# Patient Record
Sex: FEMALE | Race: WHITE | NOT HISPANIC OR LATINO | Employment: FULL TIME | URBAN - METROPOLITAN AREA
[De-identification: names, ages, dates, MRNs, and addresses within clinical notes are randomized per-mention and may not be internally consistent; named-entity substitution may affect disease eponyms.]

---

## 2019-03-11 ENCOUNTER — MEDICAL CORRESPONDENCE (OUTPATIENT)
Dept: CARDIOLOGY | Facility: CLINIC | Age: 24
End: 2019-03-11

## 2019-07-10 ENCOUNTER — TELEPHONE (OUTPATIENT)
Dept: CARDIOLOGY | Facility: CLINIC | Age: 24
End: 2019-07-10

## 2019-07-10 NOTE — TELEPHONE ENCOUNTER
Patient is planning to get pregnant. Would like to switch BP medication regimen accordingly. Also stated she is set up to have an echo in [employer].

## 2019-07-11 NOTE — TELEPHONE ENCOUNTER
Called patient and left voicemail.  Message sent to Dr Raman to review discontinuing Cozaar while trying to become pregnant.  Will call back with further recommendations.    Bird Shine, RN  RN Care Coordinator  [facility]  [phone]

## 2019-07-19 ENCOUNTER — TELEPHONE (OUTPATIENT)
Dept: CARDIOLOGY | Facility: CLINIC | Age: 24
End: 2019-07-19

## 2019-07-19 NOTE — TELEPHONE ENCOUNTER
Patient LVM, requested to speak to a RN about whether some symptoms she is having are cardiac related.

## 2019-07-22 NOTE — TELEPHONE ENCOUNTER
Patient called and voicemail left with call back number to call back to discuss concerns.      Bird Shine, RN  RN Care Coordinator  [facility]  [phone]

## 2019-07-24 NOTE — TELEPHONE ENCOUNTER
Patient called and voicemail left with call back number to discuss symptoms.      Bird Shine, RN  RN Care Coordinator  [facility]  [phone]

## 2019-08-30 ENCOUNTER — TELEPHONE (OUTPATIENT)
Dept: CARDIOLOGY | Facility: CLINIC | Age: 24
End: 2019-08-30

## 2019-08-30 NOTE — TELEPHONE ENCOUNTER
Patient of Dr Raman in [facility]. She states she's had ongoing migraines and nausea for last 3 days. She had a fever day prior to this of 102. She went to Urgent Care yesterday where they prescribed her Excedrin and an anti-emetic. Still having symptoms, states the migraines have become a constant lingering and pounding. She called the urgent care back and they recommended going to the ED.   She called us as she remembers having headaches prior to her cardiac surgery. She doesn't remember if they were quite similar to these headaches as she was really young, but she otherwise does not normally get headaches.   I told her I would let Dr Raman know but with it being late on a Friday I also recommended she go to the ER for  workup to make sure nothing acute going on. Talk to ER up there as well about cardiac concerns.     Iris verbalized understanding and will go to ER.   Briseyda Crow RN

## 2019-08-30 NOTE — TELEPHONE ENCOUNTER
Iris left a VM on the phone stating that she has been having migraines and nausea and her mom wanted her to check to make sure that it is not cardiac related.    Please give her a call back at [phone].    Ana M Narayan, CMA

## 2021-10-15 ENCOUNTER — CARE COORDINATION (OUTPATIENT)
Dept: CARDIOLOGY | Facility: CLINIC | Age: 26
End: 2021-10-15
Payer: COMMERCIAL

## 2021-10-15 NOTE — PROGRESS NOTES
Called patient. Number not working. Per Care Everywhere, called emergency contact number ([phone]). [facility] asking for a callback from [place] to [phone].     Patient should be seen near end of first trimester with echo prior. Can be at [place].

## 2021-11-10 NOTE — PROGRESS NOTES
Called and spoke with patient. She is 12 weeks' pregnant today. Informed her that Dr. Raman would like to see her at end of first trimester with echo prior. Patient would like to be seen in [place]. Agreed to December 13. KARLA Londono will call patient with echo and appointment time confirmed. Patient verbalized understanding and is in agreement to the plan.

## 2023-01-10 ENCOUNTER — TELEPHONE (OUTPATIENT)
Dept: CARDIOLOGY | Facility: CLINIC | Age: 28
End: 2023-01-10

## 2023-01-10 DIAGNOSIS — Q25.1 COARCTATION OF AORTA (PREDUCTAL) (POSTDUCTAL): Primary | ICD-10-CM

## 2023-01-10 DIAGNOSIS — R00.2 PALPITATIONS: ICD-10-CM

## 2023-01-10 NOTE — TELEPHONE ENCOUNTER
"Called Iris to discuss her symptoms she reports for the past 3 days she has felt \"butterflies\" in her chest. She feels them the most during the day when she is up and walking. No issues at night. Her blood pressure one of these past days was elevated at 164/96.   Spoke with Dr. Raman, she would like a 7 day zio. Detailed VM left with patient. Will send out zio and follow up.  Spring Landin RN on 1/10/2023 at 5:28 PM    "

## 2023-01-13 ENCOUNTER — ALLIED HEALTH/NURSE VISIT (OUTPATIENT)
Dept: CARDIOLOGY | Facility: CLINIC | Age: 28
End: 2023-01-13
Attending: INTERNAL MEDICINE
Payer: COMMERCIAL

## 2023-01-13 DIAGNOSIS — Q25.1 COARCTATION OF AORTA (PREDUCTAL) (POSTDUCTAL): ICD-10-CM

## 2023-01-13 DIAGNOSIS — R00.2 PALPITATIONS: ICD-10-CM

## 2023-01-17 PROCEDURE — 93244 EXT ECG>48HR<7D REV&INTERPJ: CPT | Performed by: INTERNAL MEDICINE

## 2023-02-16 NOTE — TELEPHONE ENCOUNTER
Called and left Iris a detailed VM. Her zio results were normal. Asked in the VM if she was still feeling symptoms. Provided callback number.  Spring Landin RN on 2/16/2023 at 12:29 PM

## 2024-10-17 ENCOUNTER — TELEPHONE (OUTPATIENT)
Dept: CARDIOLOGY | Facility: CLINIC | Age: 29
End: 2024-10-17
Payer: COMMERCIAL

## 2024-10-17 DIAGNOSIS — Q25.1 COARCTATION OF AORTA (PREDUCTAL) (POSTDUCTAL): Primary | ICD-10-CM

## 2024-10-17 DIAGNOSIS — I10 HYPERTENSION: ICD-10-CM

## 2024-10-17 NOTE — LETTER
November 1, 2024      TO: Iris Carlton  [address] [place] [id number]         Dear Iris,    Our records indicate that it is time for you to schedule your return visit with GOSIA Raman MD in the CARDIOVASCULAR CONGENITAL/GENETICS CLINIC for WINTER 2024. We have been unsuccessful in connecting with you to schedule.    To make your appointment, please call: [phone], Monday - Friday, 8 A.M. - 4:30 P.M (Central Time Zone).    Please check with your insurance company about your benefits.  Some insurance plans provide different coverage levels for services at [facility]-based clinics.     We look forward to hearing from you.    Sincerely,    Tawana Coronel CMA  Complex   [facility]  Office: [phone]  Fax: [phone]

## 2024-10-17 NOTE — TELEPHONE ENCOUNTER
Date: 10/17/2024    Time of Call: 2:29 PM     Diagnosis:  coarctation of aorta     [ TORB ] Ordering provider: Dr. Savanah Raman  Order: Follow-up with Dr. Raman in [place] with congenital CTA (no coronaries) and congenital ECHO prior.     Order received by: Payton VARNER RN      Follow-up/additional notes: Saw Dr. Raman in [place] 10/8, ECHO on 10/9 that shows increased mean gradient   START losartan 25mg daily.   START Toprol 25mg daily.

## 2024-10-17 NOTE — TELEPHONE ENCOUNTER
Spoke with patient to relay Dr. Raman's plan of needing congenital ECHO and CTA to assess increased mean gradient, patient states understanding and knows that complex  will be reaching out.    Asked how her blood pressures are doing on new regimen of losartan 25mg daily and Toprol 25mg daily. Patient states that she's checked her pressure once and it was 130s/80s. Plan for patient to recheck it a few more times over the next few weeks and then writer will call to re-assess pressures for potential med titration. Patient states understanding.

## 2024-11-01 NOTE — TELEPHONE ENCOUNTER
Attempted to reach pt to help schedule appts, no answer, LVM    Sent letter. Max attempts, if patient wishes to still be seen, please call [phone]

## 2025-03-07 NOTE — PROGRESS NOTES
CARDIOLOGY CONSULTATION:    Ms. Carlton is a pleasant 29-year-old woman with a history of coarctation of the aorta. I initially met her in 2016. Please see that note for details. She was not diagnosed with coarctation until age 12 and she had a minimal diameter of 3 mm at that time. She underwent a 25 mm stent dilated to 9 mm on 2007 and then redilatation 2008 with a 14 mm balloon with no subsequent interventions or caths.    Since I last saw her she has been doing well without any concerning cardiac symptoms. She did undergo a CTA in 2017 and that showed a patent coarctation site with no aneurysm. CTA 21 fortunately showed no change compared to prior. I reviewed as well the CTA from 3/11/25 and the official read is not back but there was no change on my review.    On echo 3/11/25 showed her bicuspid aortic valve functioning normally. Her aortic dimensions have been within normal range and not enlarging. Peak coarctation gradient is 18 mmHg, mean 10 mmHg.  She is on coreg 12.5 BID and losartan 25 mg BID and she is not taking the evening dose frequently.  Her BP is under control today.     She had a  delivery at 39 weeks after failed induction for fetal growth retardation in May 2022; she did well with that pregnancy otherwise. Her daughter was born at 5 lbs 15 ounces. She has a 4 year old nephew at home that she adopted. She has historically ran a . There was some stress with her  when I last saw her and referred her to therapy and she feels things are better now. He is with her today.       She is working on her weight and is on Wegovy. She sees weight management every 2 months.     PAST MEDICAL/SURGICAL HISTORY:  Per HPI    CURRENT MEDICATIONS:  Coreg 12.5 mg BID (changed to coreg XR 20 mg daily)  Losartan to 25 mg BID (changed to 50 mg daily)  Wegovy 1.7 mg.   Birth control.   Effexor 75 mg daily     ALLERGIES  None    FAMILY HX:  No  "CHD    SOCIAL HX:  Social History     Socioeconomic History    Marital status:      Social Drivers of Health     Financial Resource Strain: Low Risk  (5/12/2023)    [facility]    Overall Financial Resource Strain (CARDIA)     Difficulty of Paying Living Expenses: Not hard at all   Food Insecurity: No Food Insecurity (10/15/2024)    Received from [facility]    Hunger Vital Sign     Worried About Running Out of Food in the Last Year: Never true     Ran Out of Food in the Last Year: Never true   Transportation Needs: No Transportation Needs (10/15/2024)    [facility] from [facility]    PRAPARE - Transportation     Lack of Transportation (Medical): No     Lack of Transportation (Non-Medical): No   Housing Stability: Low Risk  (10/15/2024)    Received from [facility]    Housing Stability Vital Sign     Unable to Pay for Housing in the Last Year: No     Number of Times Moved in the Last Year: 0     Homeless in the Last Year: No       ROS:  Constitutional: No fever, chills, or sweats. No weight gain/loss.   ENT: No visual disturbance, ear ache, epistaxis, sore throat.   Allergies/Immunologic: Negative.   Respiratory: No cough, hemoptysis.   Cardiovascular: As per HPI.   GI: No nausea, vomiting, hematemesis, melena, or hematochezia.   : No urinary frequency, dysuria, or hematuria.   Integument: Negative.   Psychiatric: Negative.   Neuro: Negative.   Endocrinology: Negative.   Musculoskeletal: No myalgia.    PHYSICAL EXAM  Iris Carlton IS A 29 year old female.in no acute distress.  blood pressure /82.  Her pulse is 70. She is was 90.7 kg August 2022, 115 KG 2023, 108 kg 2024. She is down to 98.8 3/2025.  She is 5'5\" tall. Satting 99%.  She is a female appearing stated age. No acute distress. Cardiovascular - " she has regular rate and rhythm without any rubs, murmurs or gallops. Her lungs are bilaterally clear. Her abdomen is soft, gravid, nontender. Extremities - no clubbing, cyanosis or edema. Skin - no rashes. HEENT - good dentition. Wears glasses. No icterus.       IMPRESSION/REPORT/PLAN:   #1 Coarctation of the aorta status post initial treatment with a 25 mm stent 2007 with redilatation 2008 with a 14 mm balloon. 10mmHg mean gradient  #2 Hypertension  #3 S/P  delivery at 39 weeks for failed induction for fetal growth retardation  #4 Right-sided ovarian cyst, S/P removal 2021  #5 BMI 38 (down from 40)  #6 Anxiety/Depression    DISCUSSION: It was a pleasure to see Ms. Carlton in followup. Fortunately she is doing well from a cardiovascular standpoint without any concerning symptoms. Echo is stable.  CT stable on my review; official read pending.    Using birth control pill for pregnancy prevention, without contraindication to do so.      Switched Coreg to long acting so she can just take once a day and losartan to once a day.     Will plan to see her in a year with an echo. She understands and is in agreement with the plan.  All questions were answered. It was a pleasure to see her. Please do not hesitate to contact me with any questions or concerns.    GOSIA Raman MD   39 minutes face-face, documentation and review of records on day of visit.     The longitudinal plan of care for the diagnosis(es)/condition(s) as documented were addressed during this visit. Due to the added complexity in care, I will continue to support [place] in the subsequent management and with ongoing continuity of care.

## 2025-03-11 ENCOUNTER — ANCILLARY PROCEDURE (OUTPATIENT)
Dept: CARDIOLOGY | Facility: CLINIC | Age: 30
End: 2025-03-11
Attending: INTERNAL MEDICINE
Payer: COMMERCIAL

## 2025-03-11 ENCOUNTER — HOSPITAL ENCOUNTER (OUTPATIENT)
Dept: CT IMAGING | Facility: CLINIC | Age: 30
Discharge: HOME | End: 2025-03-11
Attending: INTERNAL MEDICINE
Payer: COMMERCIAL

## 2025-03-11 VITALS
OXYGEN SATURATION: 99 % | WEIGHT: 217.8 LBS | HEART RATE: 76 BPM | DIASTOLIC BLOOD PRESSURE: 82 MMHG | SYSTOLIC BLOOD PRESSURE: 117 MMHG

## 2025-03-11 DIAGNOSIS — I10 HYPERTENSION: ICD-10-CM

## 2025-03-11 DIAGNOSIS — Q25.1 COARCTATION OF AORTA (PREDUCTAL) (POSTDUCTAL): ICD-10-CM

## 2025-03-11 PROCEDURE — 3079F DIAST BP 80-89 MM HG: CPT | Performed by: INTERNAL MEDICINE

## 2025-03-11 PROCEDURE — 93325 DOPPLER ECHO COLOR FLOW MAPG: CPT | Performed by: STUDENT IN AN ORGANIZED HEALTH CARE EDUCATION/TRAINING PROGRAM

## 2025-03-11 PROCEDURE — 75573 CT HRT C+ STRUX CGEN HRT DS: CPT

## 2025-03-11 PROCEDURE — 250N000011 HC RX IP 250 OP 636: Performed by: STUDENT IN AN ORGANIZED HEALTH CARE EDUCATION/TRAINING PROGRAM

## 2025-03-11 PROCEDURE — 93005 ELECTROCARDIOGRAM TRACING: CPT

## 2025-03-11 PROCEDURE — 99214 OFFICE O/P EST MOD 30 MIN: CPT | Mod: 25 | Performed by: INTERNAL MEDICINE

## 2025-03-11 PROCEDURE — 93303 ECHO TRANSTHORACIC: CPT | Performed by: STUDENT IN AN ORGANIZED HEALTH CARE EDUCATION/TRAINING PROGRAM

## 2025-03-11 PROCEDURE — 3074F SYST BP LT 130 MM HG: CPT | Performed by: INTERNAL MEDICINE

## 2025-03-11 PROCEDURE — 99213 OFFICE O/P EST LOW 20 MIN: CPT | Performed by: INTERNAL MEDICINE

## 2025-03-11 PROCEDURE — 93320 DOPPLER ECHO COMPLETE: CPT | Performed by: STUDENT IN AN ORGANIZED HEALTH CARE EDUCATION/TRAINING PROGRAM

## 2025-03-11 PROCEDURE — 1126F AMNT PAIN NOTED NONE PRSNT: CPT | Performed by: INTERNAL MEDICINE

## 2025-03-11 RX ORDER — CARVEDILOL 25 MG/1
12.5 TABLET ORAL
COMMUNITY
End: 2025-03-11 | Stop reason: ALTCHOICE

## 2025-03-11 RX ORDER — CARVEDILOL PHOSPHATE 20 MG/1
20 CAPSULE, EXTENDED RELEASE ORAL DAILY
Qty: 90 CAPSULE | Refills: 3 | Status: SHIPPED | OUTPATIENT
Start: 2025-03-11

## 2025-03-11 RX ORDER — LOSARTAN POTASSIUM 50 MG/1
50 TABLET ORAL DAILY
Qty: 90 TABLET | Refills: 3 | Status: SHIPPED | OUTPATIENT
Start: 2025-03-11

## 2025-03-11 RX ORDER — SEMAGLUTIDE 1.7 MG/.75ML
INJECTION, SOLUTION SUBCUTANEOUS
COMMUNITY

## 2025-03-11 RX ORDER — LOSARTAN POTASSIUM 25 MG/1
1 TABLET ORAL 2 TIMES DAILY
COMMUNITY
Start: 2024-10-08 | End: 2025-03-11 | Stop reason: DRUGHIGH

## 2025-03-11 RX ORDER — DIAZEPAM 5 MG/1
1-2 TABLET ORAL
COMMUNITY
Start: 2024-10-15

## 2025-03-11 RX ORDER — LEVONORGESTREL/ETHIN.ESTRADIOL 0.1-0.02MG
1 TABLET ORAL DAILY
COMMUNITY
Start: 2024-04-18

## 2025-03-11 RX ORDER — VENLAFAXINE HYDROCHLORIDE 75 MG/1
CAPSULE, EXTENDED RELEASE ORAL
COMMUNITY

## 2025-03-11 RX ORDER — ONDANSETRON 4 MG/1
4 TABLET, ORALLY DISINTEGRATING ORAL
COMMUNITY
Start: 2025-03-07

## 2025-03-11 RX ORDER — IOPAMIDOL 755 MG/ML
100 INJECTION, SOLUTION INTRAVASCULAR ONCE
Status: COMPLETED | OUTPATIENT
Start: 2025-03-11 | End: 2025-03-11

## 2025-03-11 RX ADMIN — IOPAMIDOL 100 ML: 755 INJECTION, SOLUTION INTRAVENOUS at 07:34

## 2025-03-11 ASSESSMENT — PAIN SCALES - GENERAL: PAINLEVEL_OUTOF10: NO PAIN (0)

## 2025-03-11 NOTE — Clinical Note
3/11/2025      RE: Iris Carlton  [address] [facility] [id number]       Dear Colleague,    Thank you for the opportunity to participate in the care of your patient, Iris Carlton, at the [facility] [place] at [facility]. Please see a copy of my visit note below.    CARDIOLOGY CONSULTATION:    Ms. Carlton is a pleasant 29-year-old woman with a history of coarctation of the aorta. I initially met her in 2016. Please see that note for details. She was not diagnosed with coarctation until age 12 and she had a minimal diameter of 3 mm at that time. She underwent a 25 mm stent dilated to 9 mm on 2007 and then redilatation 2008 with a 14 mm balloon with no subsequent interventions or caths.    Since I last saw her she has been doing well without any concerning cardiac symptoms. She did undergo a CTA in 2017 and that showed a patent coarctation site with no aneurysm. CTA 21 fortunately showed no change compared to prior.     On echo 23, her bicuspid aortic valve was functioning normally. Her aortic dimensions have been within normal range and not enlarging. Peak coarctation gradient is 18 mmHg, mean 10 mmHg. Historically her BP has been controlled, but this visit it was elevated and remained elevated. She is to have her annual echo tomorrow. She was on Toprol 25 mg daily and losartan 25 mg daily. Her LE blood pressures are higher than her upper extremity (/113, /101).     She had a  delivery at 39 weeks after failed induction for fetal growth retardation in May 2022; she did well with that pregnancy otherwise. Her daughter was born at 5 lbs 15 ounces. She has a 3.5 year old nephew at home that she adopted. 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 She also has stress in her personal life with her , it sounds like. She does not have a  therapist at this point. She is working on her weight and is upping her Wegovy dose to 1 tomorrow. She is also following up with her primary and plans to ask about increasing her dose of Effexor.       PAST MEDICAL/SURGICAL HISTORY:  Per HPI    CURRENT MEDICATIONS:  Coreg 12.5 mg BID (changed from toprol 35 daily)  Losartan to 25 mg BID.   Wegovy 1 mg.   Birth control.   Effexor 75 mg daily     ALLERGIES  None    FAMILY HX:  No CHD    SOCIAL HX:  Social History     Socioeconomic History    Marital status:      Social Drivers of Health     Financial Resource Strain: Low Risk  (5/12/2023)    [facility]    Overall Financial Resource Strain (CARDIA)     Difficulty of Paying Living Expenses: Not hard at all   Food Insecurity: No Food Insecurity (10/15/2024)    Received from [facility]    Hunger Vital Sign     Worried About Running Out of Food in the Last Year: Never true     Ran Out of Food in the Last Year: Never true   Transportation Needs: No Transportation Needs (10/15/2024)    Received from [facility]    PRAPARE - Transportation     Lack of Transportation (Medical): No     Lack of Transportation (Non-Medical): No   Housing Stability: Low Risk  (10/15/2024)    Received from [facility]    Housing Stability Vital Sign     Unable to Pay for Housing in the Last Year: No     Number of Times Moved in the Last Year: 0     Homeless in the Last Year: No       ROS:  Constitutional: No fever, chills, or sweats. No weight gain/loss.   ENT: No visual disturbance, ear ache, epistaxis, sore throat.   Allergies/Immunologic: Negative.   Respiratory: No cough, hemoptysis.   Cardiovascular: As per HPI.   GI: No nausea, vomiting, hematemesis, melena, or hematochezia.   : No urinary frequency, dysuria, or hematuria.  "  Integument: Negative.   Psychiatric: Negative.   Neuro: Negative.   Endocrinology: Negative.   Musculoskeletal: No myalgia.    PHYSICAL EXAM  Iris Carlton IS A 29 year old female.in no acute distress.  blood pressure /101, /113 Her pulse is 70. She is was 90.7 kg 2022, 115 KG , 108 kg . 5'5\" tall. Satting 99%. BMI 40. She is a female appearing stated age. No acute distress. Cardiovascular - she has regular rate and rhythm without any rubs, murmurs or gallops. Her lungs are bilaterally clear. Her abdomen is soft, gravid, nontender. Extremities - no clubbing, cyanosis or edema. Skin - no rashes. HEENT - good dentition. Wears glasses. No icterus.       IMPRESSION/REPORT/PLAN:   #1 Coarctation of the aorta status post initial treatment with a 25 mm stent 2007 with redilatation 2008 with a 14 mm balloon. 10mmHg mean gradient  #2 Hypertension  #3 S/P  delivery at 39 weeks for failed induction for fetal growth retardation  #4 Right-sided ovarian cyst, S/P removal 2021  #5 BMI 40  #6 Anxiety/Depression    DISCUSSION: It was a pleasure to see Ms. Carlton in followup. Fortunately she is doing well from a cardiovascular standpoint without any concerning symptoms. Echo is scheduled for tomorrow. Using birth control pill, without contraindication to do so.     Will plan to see her in 6 months. Also referred to therapy.   All questions were answered. It was a pleasure to see her. Please do not hesitate to contact me with any questions or concerns.    GOSIA Raman MD   38 minutes face-face, documentation and review of records on day of visit.       Please do not hesitate to contact me if you have any questions/concerns.     Sincerely,     Gutierrez Raman MD  "

## 2025-03-11 NOTE — NURSING NOTE
Chief Complaint   Patient presents with    Follow Up     29 year old female with history of coarctation of the aorta presenting for evaluation. She was not diagnosed with coarctation until age 12 and she had a minimal diameter of 3 mm at that time. She underwent a 25 mm stent dilated to 9 mm on September 24, 2007 and then redilatation March 24, 2008 with a 14 mm balloon with no subsequent interventions or caths.       Vitals were take, medications reconciled and EKG performed.    Sudhakar Marie, Clinic Assistant     10:49 AM

## 2025-03-11 NOTE — PATIENT INSTRUCTIONS
"Thank you for visiting the Adult Congenital and Cardiovascular Genetics Clinic at the [facility].    Cardiology Providers you saw during your visit:  GOSIA Raman MD    Diagnosis:  coarctation of the aorta    Results:  GOSIA Raman MD reviewed the results of your EKG, echo and congenital CTA testing today in clinic.    If you have questions or concerns, please call us at [phone] or contact us through [employer].  ______________________________________________________________________________    Recommendations from your Cardiology Provider TODAY:    STOP regular release Coreg  START extended release Coreg 20mg daily  INCREASE losartan to 50mg daily.       ____________________________________________________________________________________________________    Follow-up Plan:  Follow-up with Dr. Danisha Pavon in 1 year with ECHO prior. Please call [phone]  [facility], Cardiology Scheduling     ____________________________________________________________________________________________________    If you have questions or concerns, please call us at [phone] or contact us through [employer]. Our fax number is [phone]    Gisell Starks RN RN, BSN   Tawana Coronel (Scheduling)  Nurse Care Coordinator     Clinic   Adult Congenital and CV Genetics              Adult Congenital and CV Genetics  [facility]              [facility]        For after hours urgent needs, call [phone] and ask to speak to the \"On-Call Cardiologist.\"    For emergencies call 911.      ____________________________________________________________________________________________________    Additional Important Information for Your Heart Health      General Cardiac Recommendations:  Continue to eat a heart healthy, low salt diet.  Continue to get 20-30 minutes of aerobic activity, 4-5 days per week.  Examples of aerobic " activity include walking, running, swimming, cycling, etc.  Continue to observe good oral hygiene, with regular dental visits.        SBE prophylaxis (antibiotics needed before dental appointments):   Yes____  No__X__    SBE prophylaxis applies to patients with certain heart conditions who are recommended to take antibiotics before dental appointments and other specific procedures. These antibiotics are to help prevent an infection of the heart (endocarditis) that certain patients are at higher risk of developing. The guidelines used come from the American Heart Association and are periodically updated.        FASTING CHOLESTEROL was checked in the last 5 years YES____  NO__X__ (none)  If no, please follow up with your primary care physician. You should have a cholesterol screening every 5 years at minimum, and every year if taking a medication for your cholesterol levels.

## 2025-03-11 NOTE — LETTER
management and with ongoing continuity of care.       Please do not hesitate to contact me if you have any questions/concerns.     Sincerely,     Gutierrez Raman MD

## 2025-03-12 LAB
ATRIAL RATE - MUSE: 72 BPM
DIASTOLIC BLOOD PRESSURE - MUSE: NORMAL MMHG
INTERPRETATION ECG - MUSE: NORMAL
P AXIS - MUSE: 9 DEGREES
PR INTERVAL - MUSE: 184 MS
QRS DURATION - MUSE: 100 MS
QT - MUSE: 386 MS
QTC - MUSE: 422 MS
R AXIS - MUSE: 15 DEGREES
SYSTOLIC BLOOD PRESSURE - MUSE: NORMAL MMHG
T AXIS - MUSE: 6 DEGREES
VENTRICULAR RATE- MUSE: 72 BPM

## 2025-07-06 ENCOUNTER — HEALTH MAINTENANCE LETTER (OUTPATIENT)
Age: 30
End: 2025-07-06